# Patient Record
Sex: MALE | Race: WHITE | Employment: FULL TIME | URBAN - METROPOLITAN AREA
[De-identification: names, ages, dates, MRNs, and addresses within clinical notes are randomized per-mention and may not be internally consistent; named-entity substitution may affect disease eponyms.]

---

## 2022-07-13 ENCOUNTER — APPOINTMENT (OUTPATIENT)
Dept: GENERAL RADIOLOGY | Age: 52
End: 2022-07-13
Payer: COMMERCIAL

## 2022-07-13 ENCOUNTER — HOSPITAL ENCOUNTER (EMERGENCY)
Age: 52
Discharge: HOME | End: 2022-07-13
Attending: EMERGENCY MEDICINE
Payer: COMMERCIAL

## 2022-07-13 VITALS
WEIGHT: 195 LBS | HEIGHT: 71 IN | HEART RATE: 75 BPM | TEMPERATURE: 98 F | RESPIRATION RATE: 14 BRPM | BODY MASS INDEX: 27.3 KG/M2 | OXYGEN SATURATION: 95 % | DIASTOLIC BLOOD PRESSURE: 69 MMHG | SYSTOLIC BLOOD PRESSURE: 135 MMHG

## 2022-07-13 DIAGNOSIS — T59.91XA TOXIC INHALATION INJURY, ACCIDENTAL OR UNINTENTIONAL, INITIAL ENCOUNTER: Primary | ICD-10-CM

## 2022-07-13 PROCEDURE — 99284 EMERGENCY DEPT VISIT MOD MDM: CPT

## 2022-07-13 PROCEDURE — 96374 THER/PROPH/DIAG INJ IV PUSH: CPT

## 2022-07-13 PROCEDURE — 6360000002 HC RX W HCPCS: Performed by: STUDENT IN AN ORGANIZED HEALTH CARE EDUCATION/TRAINING PROGRAM

## 2022-07-13 PROCEDURE — 71045 X-RAY EXAM CHEST 1 VIEW: CPT

## 2022-07-13 RX ORDER — METHYLPREDNISOLONE SODIUM SUCCINATE 125 MG/2ML
125 INJECTION, POWDER, LYOPHILIZED, FOR SOLUTION INTRAMUSCULAR; INTRAVENOUS ONCE
Status: COMPLETED | OUTPATIENT
Start: 2022-07-13 | End: 2022-07-13

## 2022-07-13 RX ADMIN — METHYLPREDNISOLONE SODIUM SUCCINATE 125 MG: 125 INJECTION, POWDER, FOR SOLUTION INTRAMUSCULAR; INTRAVENOUS at 12:21

## 2022-07-13 NOTE — ED PROVIDER NOTES
Department of Emergency Medicine   ED Provider Note  Admit Date/RoomTime: 7/13/2022 11:43 AM  ED Room: /          History of Present Illness:  7/13/22, Time: 11:49 AM EDT         Clifford Carty is a 46 y.o. male presenting to the ED for toxic inhalation, beginning just prior to arrival.  The complaint has been intermittent, mild in severity, and worsened by nothing. Patient states he was pumping a tank that he thought was just chlorine, but it was mixed with aluminum sulfite and there were fumes that he accidentally inhaled. He had some burning of the back of his throat, a dry cough, this has since resolved. No shortness of breath or chest pain. He is not a smoker, no history of COPD or asthma. He feels asymptomatic at this time. No lightheadedness or syncope, no eye burning or itching or tearing. No abdominal pain or nausea or vomiting. No fevers or chills. No headache or vision changes. Review of Systems:     Pertinent positives and negatives are stated within HPI. 10 point ROS otherwise negative.    --------------------------------------------- PAST HISTORY ---------------------------------------------  Past Medical History:  has no past medical history on file. Past Surgical History:  has no past surgical history on file. Social History:      Family History: family history is not on file. The patients home medications have been reviewed. Allergies: Patient has no known allergies.         ---------------------------------------------------PHYSICAL EXAM--------------------------------------    Constitutional/General: AAO to person/place/time/purpose, NAD, no labored breathing  Head: Normocephalic and atraumatic  Eyes: EOMI, conjunctiva normal, sclera non icteric  Mouth: Moist mucous membranes, uvula midline  Neck: Supple, no stridor, no meningeal signs  Respiratory: Mild scattered expiratory wheezing, especially in the right lung base, otherwise clear to auscultation, no accessory muscle use or rales or rhonchi or respiratory distress. Cardiovascular:  Regular rate. Regular rhythm. No murmurs, no gallops, or rubs. 2+ distal pulses. Equal extremity pulses. Chest: No chest wall tenderness or deformity  GI:  Abdomen Soft, Non tender, Non distended. No rebound, guarding, or rigidity. No pulsatile masses. Epigastric vertical surgical scar noted. Musculoskeletal: Moves all extremities x 4. Warm and well perfused, no clubbing, cyanosis, or edema. Capillary refill <3 seconds  Integument: skin warm and dry. No rashes. Neurologic: GCS 15, no focal deficits, symmetric strength 5/5 in the major muscle groups of upper and lower extremities bilaterally  Psychiatric: Normal Affect    -------------------------------------------------- RESULTS -------------------------------------------------  I have personally reviewed all laboratory and imaging results for this patient. Results are listed below. LABS:  No results found for this visit on 07/13/22. RADIOLOGY:  Interpreted by Radiologist unless otherwise specified  XR CHEST PORTABLE   Final Result   No acute process. ------------------------- NURSING NOTES AND VITALS REVIEWED ---------------------------   The nursing notes within the ED encounter and vital signs as below have been reviewed by myself. BP (!) 149/76   Pulse 85   Temp 98.3 °F (36.8 °C)   Resp 20   Ht 5' 11\" (1.803 m)   Wt 195 lb (88.5 kg)   SpO2 94%   BMI 27.20 kg/m²   Oxygen Saturation Interpretation: Normal    The patients available past medical records and past encounters were reviewed. ------------------------------ ED COURSE/MEDICAL DECISION MAKING----------------------  Medications   methylPREDNISolone sodium (SOLU-MEDROL) injection 125 mg (125 mg IntraVENous Given 7/13/22 1221)            Medical Decision Making: This is a 55-year-old male presenting to the emergency department for accidental inhalation of chlorine/aluminum sulfite.   Patient initially had coughing, mild irritation of the throat. Patient here asymptomatic, not requiring supplemental oxygen. Chest x-ray without acute intrathoracic process. Patient normotensive, afebrile, not tachycardic, no respiratory distress. He did have some slight wheezing on exam, given IV steroids. Patient was observed for several hours, no recurrent symptoms. Given patient's reassuring work-up, exam, patient will be discharged home with outpatient follow-up and strict return precautions. See ED COURSE for additional MDM. Labs & imaging reviewed and interpreted, see RESULTS above. Re-Evaluations: This patient's ED course included: a personal history and physicial examination, re-evaluation prior to disposition, multiple bedside re-evaluations, IV medications and continuous pulse oximetry    This patient has remained hemodynamically stable during their ED course. Consultations:  See ED Course    Counseling: The emergency provider has spoken with the patient and discussed todays results, in addition to providing specific details for the plan of care and counseling regarding the diagnosis and prognosis. Questions are answered at this time and they are agreeable with the plan.       --------------------------------- IMPRESSION AND DISPOSITION ---------------------------------    IMPRESSION  1. Toxic inhalation injury, accidental or unintentional, initial encounter        DISPOSITION  Disposition: Discharge to home  Patient condition is stable    NOTE: This report was transcribed using voice recognition software. Every effort was made to ensure accuracy; however, inadvertent computerized transcription errors may be present. Also please note that patient was seen and examined by attending physician. Plan of care and disposition discussed with attending physician and they were immediately available or present for all procedures performed.        -- Celio Prescott D.O. PGY-3 Resident Physician     Emergency Medicine      7/13/2022 1:22 PM        [address] DO  Resident  07/13/22 132